# Patient Record
Sex: FEMALE | Race: BLACK OR AFRICAN AMERICAN | NOT HISPANIC OR LATINO | URBAN - METROPOLITAN AREA
[De-identification: names, ages, dates, MRNs, and addresses within clinical notes are randomized per-mention and may not be internally consistent; named-entity substitution may affect disease eponyms.]

---

## 2018-10-14 ENCOUNTER — EMERGENCY (EMERGENCY)
Facility: HOSPITAL | Age: 28
LOS: 1 days | Discharge: HOME | End: 2018-10-14
Attending: EMERGENCY MEDICINE | Admitting: EMERGENCY MEDICINE
Payer: MEDICAID

## 2018-10-14 VITALS
TEMPERATURE: 99 F | HEART RATE: 99 BPM | SYSTOLIC BLOOD PRESSURE: 121 MMHG | OXYGEN SATURATION: 100 % | RESPIRATION RATE: 16 BRPM | DIASTOLIC BLOOD PRESSURE: 71 MMHG

## 2018-10-14 VITALS
TEMPERATURE: 98 F | RESPIRATION RATE: 18 BRPM | DIASTOLIC BLOOD PRESSURE: 59 MMHG | OXYGEN SATURATION: 100 % | HEART RATE: 73 BPM | SYSTOLIC BLOOD PRESSURE: 104 MMHG

## 2018-10-14 PROCEDURE — 99283 EMERGENCY DEPT VISIT LOW MDM: CPT | Mod: 25

## 2018-10-14 PROCEDURE — 99053 MED SERV 10PM-8AM 24 HR FAC: CPT

## 2018-10-14 PROCEDURE — 12002 RPR S/N/AX/GEN/TRNK2.6-7.5CM: CPT | Mod: LT

## 2018-10-14 RX ORDER — TETANUS TOXOID, REDUCED DIPHTHERIA TOXOID AND ACELLULAR PERTUSSIS VACCINE, ADSORBED 5; 2.5; 8; 8; 2.5 [IU]/.5ML; [IU]/.5ML; UG/.5ML; UG/.5ML; UG/.5ML
0.5 SUSPENSION INTRAMUSCULAR ONCE
Qty: 0 | Refills: 0 | Status: COMPLETED | OUTPATIENT
Start: 2018-10-14 | End: 2018-10-14

## 2018-10-14 RX ADMIN — TETANUS TOXOID, REDUCED DIPHTHERIA TOXOID AND ACELLULAR PERTUSSIS VACCINE, ADSORBED 0.5 MILLILITER(S): 5; 2.5; 8; 8; 2.5 SUSPENSION INTRAMUSCULAR at 09:08

## 2018-10-14 NOTE — ED ADULT TRIAGE NOTE - CHIEF COMPLAINT QUOTE
pt states she was playing with a razor and accidently cut her wrist. wrapped in gauze by ems, no active bleeding noted. pt denies si/hi/ah/vh or etoh and drug use. nad noted

## 2018-10-14 NOTE — ED ADULT NURSE NOTE - OBJECTIVE STATEMENT
rec'd pt. a&ox3, came in c/o laceration on wrist. as per pt, "I was playing with the razor and accidentally cut myself." noted approx 1.5 inch length lac noted on left wrist, no active bleeding. also noted some superficial cuts on left f/a, and scars on left from f/a. pt. refuses to talk about the other "cuts". pt. denies any SI/HI/hallucinations. boyfriend at bedside. MD Resident Maeve made aware and will see pt. will continue to monitor

## 2018-10-14 NOTE — ED PROVIDER NOTE - CARE PLAN
Principal Discharge DX:	Laceration of forearm Principal Discharge DX:	Laceration of forearm  Secondary Diagnosis:	Depression

## 2018-10-14 NOTE — ED ADULT NURSE NOTE - HEART RATE (BEATS/MIN)
100 Pt with 4th finger injury. Oblique fracture of the 4th proximal phalange on x-ray. Hand consult, Dr Lawrence on call and will come to ED for reduction and evaluation.

## 2018-10-14 NOTE — ED ADULT NURSE REASSESSMENT NOTE - NS ED NURSE REASSESS COMMENT FT1
Pt refusing labs at this time. Having lac repaired at bedside at this time. Will continue to monitor.

## 2018-10-14 NOTE — ED PROVIDER NOTE - MEDICAL DECISION MAKING DETAILS
laceration repair laceration repair, self-inflicted wound with hx of cutting, currently denies SI/HI, offered psych eval. or referrals, pt. declines.

## 2018-10-14 NOTE — ED PROVIDER NOTE - NS ED ROS FT
Constitutional: no fever  Eyes: no conjunctivitis  Ears: no ear pain   Nose: no nasal congestion, Mouth/Throat: no throat pain, Neck: no stiffness  Cardiovascular: no chest pain  Chest: no cough  Gastrointestinal: no abdominal pain, no vomiting and diarrhea  MSK: no joint pain  : no dysuria  Skin: +laceration   Neuro: no LOC

## 2018-10-14 NOTE — ED PROVIDER NOTE - PHYSICAL EXAMINATION
gen: well appearing  Mentation: AAO x 3  psych: mood appropriate  ENT: airway patent  Eyes: conjunctivae clear bilaterally  Cardio: RRR, no m/r/g  Resp: normal BS b/l  GI: s/nt/nd   Neuro: radial median and ulnar sensory and motor function intact of left arm with intact radial pulse  Skin: 3 cm laceration over volar aspect of left forearm with no active exsanguination,   MSK: normal movement of all extremities

## 2018-10-14 NOTE — ED PROVIDER NOTE - OBJECTIVE STATEMENT
[age] yo female presenting with laceration over left forearm after she was playing with a razor.  currently states that pain is very mild, did not take anything for her symptoms, bleeding controlled with pressure.  last cut herself yesterday because of stress.  currently denies hi or si.  feels safe at home.  never seen a psychiatrist.  last tetanus unknown.

## 2018-10-14 NOTE — ED ADULT NURSE NOTE - NSIMPLEMENTINTERV_GEN_ALL_ED
Implemented All Universal Safety Interventions:  [place] to call system. Call bell, personal items and telephone within reach. Instruct patient to call for assistance. Room bathroom lighting operational. Non-slip footwear when patient is off stretcher. Physically safe environment: no spills, clutter or unnecessary equipment. Stretcher in lowest position, wheels locked, appropriate side rails in place.

## 2018-10-14 NOTE — ED PROVIDER NOTE - ATTENDING CONTRIBUTION TO CARE
Seen and examined, thin F with L volar forearm superficial straight incision with no active bleeding. Wound examined to base, no tendon involvement, full ROM and strength. Pt. with visible, multiple, healed, bilat scars to forearms btw flexor surface elbow and wrist, states has not required stitches in the past but today had inc. depth of cutting so reported to ED. Pt. endorses depression and inc. social stressors but states she has been cutting for a long period and usually cuts more when stressed. States not having thoughts of hurting self or others and has not attempted suicide or had specific plan in the past. Pt. agrees that her wound today is a result of inc. depression but prefers to FU with her PMD and does not require admission or need to see ED psychiatrist today.

## 2018-10-15 ENCOUNTER — INPATIENT (INPATIENT)
Facility: HOSPITAL | Age: 28
LOS: 9 days | Discharge: HOME | End: 2018-10-25
Attending: PSYCHIATRY & NEUROLOGY | Admitting: PSYCHIATRY & NEUROLOGY
Payer: MEDICAID

## 2018-10-15 VITALS
TEMPERATURE: 98 F | OXYGEN SATURATION: 98 % | SYSTOLIC BLOOD PRESSURE: 123 MMHG | RESPIRATION RATE: 18 BRPM | DIASTOLIC BLOOD PRESSURE: 61 MMHG | HEART RATE: 110 BPM

## 2018-10-15 DIAGNOSIS — F33.2 MAJOR DEPRESSIVE DISORDER, RECURRENT SEVERE WITHOUT PSYCHOTIC FEATURES: ICD-10-CM

## 2018-10-15 DIAGNOSIS — F41.9 ANXIETY DISORDER, UNSPECIFIED: ICD-10-CM

## 2018-10-15 DIAGNOSIS — F12.10 CANNABIS ABUSE, UNCOMPLICATED: ICD-10-CM

## 2018-10-15 LAB
ALBUMIN SERPL ELPH-MCNC: 4.5 G/DL — SIGNIFICANT CHANGE UP (ref 3.3–5)
ALP SERPL-CCNC: 37 U/L — LOW (ref 40–120)
ALT FLD-CCNC: 13 U/L — SIGNIFICANT CHANGE UP (ref 4–33)
AMPHET UR-MCNC: NEGATIVE — SIGNIFICANT CHANGE UP
APAP SERPL-MCNC: < 15 UG/ML — LOW (ref 15–25)
APPEARANCE UR: CLEAR — SIGNIFICANT CHANGE UP
AST SERPL-CCNC: 18 U/L — SIGNIFICANT CHANGE UP (ref 4–32)
BACTERIA # UR AUTO: NEGATIVE — SIGNIFICANT CHANGE UP
BARBITURATES UR SCN-MCNC: NEGATIVE — SIGNIFICANT CHANGE UP
BASOPHILS # BLD AUTO: 0.04 K/UL — SIGNIFICANT CHANGE UP (ref 0–0.2)
BASOPHILS NFR BLD AUTO: 0.5 % — SIGNIFICANT CHANGE UP (ref 0–2)
BENZODIAZ UR-MCNC: NEGATIVE — SIGNIFICANT CHANGE UP
BILIRUB SERPL-MCNC: 0.7 MG/DL — SIGNIFICANT CHANGE UP (ref 0.2–1.2)
BILIRUB UR-MCNC: NEGATIVE — SIGNIFICANT CHANGE UP
BLOOD UR QL VISUAL: NEGATIVE — SIGNIFICANT CHANGE UP
BUN SERPL-MCNC: 11 MG/DL — SIGNIFICANT CHANGE UP (ref 7–23)
CALCIUM SERPL-MCNC: 9.4 MG/DL — SIGNIFICANT CHANGE UP (ref 8.4–10.5)
CANNABINOIDS UR-MCNC: POSITIVE — SIGNIFICANT CHANGE UP
CHLORIDE SERPL-SCNC: 101 MMOL/L — SIGNIFICANT CHANGE UP (ref 98–107)
CO2 SERPL-SCNC: 23 MMOL/L — SIGNIFICANT CHANGE UP (ref 22–31)
COCAINE METAB.OTHER UR-MCNC: NEGATIVE — SIGNIFICANT CHANGE UP
COLOR SPEC: YELLOW — SIGNIFICANT CHANGE UP
CREAT SERPL-MCNC: 0.79 MG/DL — SIGNIFICANT CHANGE UP (ref 0.5–1.3)
EOSINOPHIL # BLD AUTO: 0.08 K/UL — SIGNIFICANT CHANGE UP (ref 0–0.5)
EOSINOPHIL NFR BLD AUTO: 1.1 % — SIGNIFICANT CHANGE UP (ref 0–6)
ETHANOL BLD-MCNC: < 10 MG/DL — SIGNIFICANT CHANGE UP
GLUCOSE SERPL-MCNC: 85 MG/DL — SIGNIFICANT CHANGE UP (ref 70–99)
GLUCOSE UR-MCNC: NEGATIVE — SIGNIFICANT CHANGE UP
HCG SERPL-ACNC: < 5 MIU/ML — SIGNIFICANT CHANGE UP
HCT VFR BLD CALC: 37.8 % — SIGNIFICANT CHANGE UP (ref 34.5–45)
HGB BLD-MCNC: 12.1 G/DL — SIGNIFICANT CHANGE UP (ref 11.5–15.5)
HYALINE CASTS # UR AUTO: SIGNIFICANT CHANGE UP
IMM GRANULOCYTES # BLD AUTO: 0.02 # — SIGNIFICANT CHANGE UP
IMM GRANULOCYTES NFR BLD AUTO: 0.3 % — SIGNIFICANT CHANGE UP (ref 0–1.5)
KETONES UR-MCNC: NEGATIVE — SIGNIFICANT CHANGE UP
LEUKOCYTE ESTERASE UR-ACNC: SIGNIFICANT CHANGE UP
LYMPHOCYTES # BLD AUTO: 1.92 K/UL — SIGNIFICANT CHANGE UP (ref 1–3.3)
LYMPHOCYTES # BLD AUTO: 26.1 % — SIGNIFICANT CHANGE UP (ref 13–44)
MCHC RBC-ENTMCNC: 29.8 PG — SIGNIFICANT CHANGE UP (ref 27–34)
MCHC RBC-ENTMCNC: 32 % — SIGNIFICANT CHANGE UP (ref 32–36)
MCV RBC AUTO: 93.1 FL — SIGNIFICANT CHANGE UP (ref 80–100)
METHADONE UR-MCNC: NEGATIVE — SIGNIFICANT CHANGE UP
MONOCYTES # BLD AUTO: 0.46 K/UL — SIGNIFICANT CHANGE UP (ref 0–0.9)
MONOCYTES NFR BLD AUTO: 6.3 % — SIGNIFICANT CHANGE UP (ref 2–14)
NEUTROPHILS # BLD AUTO: 4.83 K/UL — SIGNIFICANT CHANGE UP (ref 1.8–7.4)
NEUTROPHILS NFR BLD AUTO: 65.7 % — SIGNIFICANT CHANGE UP (ref 43–77)
NITRITE UR-MCNC: NEGATIVE — SIGNIFICANT CHANGE UP
NRBC # FLD: 0 — SIGNIFICANT CHANGE UP
OPIATES UR-MCNC: NEGATIVE — SIGNIFICANT CHANGE UP
OXYCODONE UR-MCNC: NEGATIVE — SIGNIFICANT CHANGE UP
PCP UR-MCNC: NEGATIVE — SIGNIFICANT CHANGE UP
PH UR: 6 — SIGNIFICANT CHANGE UP (ref 5–8)
PLATELET # BLD AUTO: 179 K/UL — SIGNIFICANT CHANGE UP (ref 150–400)
PMV BLD: 10.5 FL — SIGNIFICANT CHANGE UP (ref 7–13)
POTASSIUM SERPL-MCNC: 3.8 MMOL/L — SIGNIFICANT CHANGE UP (ref 3.5–5.3)
POTASSIUM SERPL-SCNC: 3.8 MMOL/L — SIGNIFICANT CHANGE UP (ref 3.5–5.3)
PROT SERPL-MCNC: 7.5 G/DL — SIGNIFICANT CHANGE UP (ref 6–8.3)
PROT UR-MCNC: NEGATIVE — SIGNIFICANT CHANGE UP
RBC # BLD: 4.06 M/UL — SIGNIFICANT CHANGE UP (ref 3.8–5.2)
RBC # FLD: 13.8 % — SIGNIFICANT CHANGE UP (ref 10.3–14.5)
RBC CASTS # UR COMP ASSIST: SIGNIFICANT CHANGE UP (ref 0–?)
SALICYLATES SERPL-MCNC: < 5 MG/DL — LOW (ref 15–30)
SODIUM SERPL-SCNC: 139 MMOL/L — SIGNIFICANT CHANGE UP (ref 135–145)
SP GR SPEC: 1.02 — SIGNIFICANT CHANGE UP (ref 1–1.04)
SQUAMOUS # UR AUTO: SIGNIFICANT CHANGE UP
TSH SERPL-MCNC: 1.24 UIU/ML — SIGNIFICANT CHANGE UP (ref 0.27–4.2)
UROBILINOGEN FLD QL: NORMAL — SIGNIFICANT CHANGE UP
WBC # BLD: 7.35 K/UL — SIGNIFICANT CHANGE UP (ref 3.8–10.5)
WBC # FLD AUTO: 7.35 K/UL — SIGNIFICANT CHANGE UP (ref 3.8–10.5)
WBC UR QL: SIGNIFICANT CHANGE UP (ref 0–?)

## 2018-10-15 PROCEDURE — 99285 EMERGENCY DEPT VISIT HI MDM: CPT

## 2018-10-15 NOTE — ED ADULT TRIAGE NOTE - CHIEF COMPLAINT QUOTE
p/t with hx anxiety c/o of increased anxiety, panic attacks and Si p/t crying @ present, poor historian

## 2018-10-15 NOTE — ED BEHAVIORAL HEALTH ASSESSMENT NOTE - SUICIDE RISK FACTORS
Command hallucinations to hurt self/Substance abuse/dependence/Agitation/severe anxiety/Hopelessness/Highly impulsive behavior/Perceived burden on family and others/Unable to engage in safety planning

## 2018-10-15 NOTE — ED PROVIDER NOTE - PROGRESS NOTE DETAILS
ED Attending (Dr Ling): This patient was signed out to me by the NP after their complete evaluation, at the end of their shift due to lack of disposition pending BH evaluation.  The NP performed initial exam and authored the entire note, and also completed the medical clearance.  I have personally performed a bedside re-evaluation of this patient and have followed up on outstanding issues.  I have discussed the case with the NP and I updated the note if needed. There has been no change in condition, bed is pending assignment.  Dr. Darlingfish taking over care at this point. Ivy: Medically cleared and signed out to me. Wounds sutured. HR improved. Medically cleared. Awaiting bed for [facility] admission (SI, self harm). [place]: No overnight issues. Awaiting bed for [facility] admission. Noam: pt signed out to me pending psych admission. No beds currently. Here with SA, laceration to wrist s/p repair. Medically cleared. Noam: has bed in [facility]. accepting physician Janessa Castanon.

## 2018-10-15 NOTE — ED BEHAVIORAL HEALTH ASSESSMENT NOTE - OTHER PAST PSYCHIATRIC HISTORY (INCLUDE DETAILS REGARDING ONSET, COURSE OF ILLNESS, INPATIENT/OUTPATIENT TREATMENT)
no formal prior psychiatric history (had seen a psychiatrist one time when she was 10yo for appearing withdrawn), no prior inpatient or outpatient treatment, history of cutting as a teen that recently remerged (last cut yesterday, requiring 7 stitches), no prior psychotropic medication trials

## 2018-10-15 NOTE — ED BEHAVIORAL HEALTH ASSESSMENT NOTE - SUMMARY
29yo  female, , domiciled with boyfriend, unemployed, no formal prior psychiatric history (had seen a psychiatrist one time when she was 8yo for appearing withdrawn), no prior inpatient or outpatient treatment, history of cutting as a teen that recently remerged (last cut yesterday, requiring 7 stitches), no prior psychotropic medication trials, no access to guns or weapons, no significant PMH or legal issues, substance history of cannabis (last used earlier today), self referred for ongoing suicidal ideation following a suicide attempt yesterday.     Patient presents following suicide attempt yesterday where she cut her wrist, requiring 7 stitches. Reports she was not truthful yesterday as she wanted to leave the ED and minimized her symptoms. She presents again today, reports ongoing suicidal ideation and requesting help. Denies ted and psychosis. At this time, patient meets criteria for inpatient admission as she is an imminent risk to self - patient is amenable to voluntary admission. 27yo  female, , domiciled with boyfriend, unemployed, no formal prior psychiatric history (had seen a psychiatrist one time when she was 10yo for appearing withdrawn), no prior inpatient or outpatient treatment, history of cutting as a teen that recently remerged (last cut yesterday, requiring 7 stitches), no prior psychotropic medication trials, no access to guns or weapons, no significant PMH or legal issues, substance history of cannabis (last used earlier today), self referred for ongoing suicidal ideation following a suicide attempt yesterday.     Patient presents following suicide attempt yesterday where she cut her wrist, requiring 7 stitches. Reports she was not truthful yesterday as she wanted to leave the ED and minimized her symptoms. She presents again today, reports ongoing suicidal ideation and requesting help and was fearful she would harm herself again. Denies ted and psychosis. Patient is impulsive and unpredictable with poor insight. At this time, patient meets criteria for inpatient admission as she is an imminent risk to self.

## 2018-10-15 NOTE — ED BEHAVIORAL HEALTH ASSESSMENT NOTE - HPI (INCLUDE ILLNESS QUALITY, SEVERITY, DURATION, TIMING, CONTEXT, MODIFYING FACTORS, ASSOCIATED SIGNS AND SYMPTOMS)
27yo  female, , domiciled with boyfriend, unemployed, no formal prior psychiatric history (had seen a psychiatrist one time when she was 10yo for appearing withdrawn), no prior inpatient or outpatient treatment, history of cutting as a teen that recently remerged (last cut yesterday, requiring 7 stitches), no prior psychotropic medication trials, no access to guns or weapons, no significant PMH or legal issues, substance history of cannabis (last used earlier today), self referred for ongoing suicidal ideation following a suicide attempt yesterday.     Patient unable to provide phone numbers for any collateral sources.     Patient guarded and withdrawn during interview. She reports that she presented to the ED yesterday after she cut herself during an argument with her boyfriend - refuses to disclose what argument was about. She reports minimizing what had happened and denying suicidal ideation as she wanted to leave and go home. She reports she went home, got into another argument with her boyfriend, and continued to have thoughts of self harm and came back to the ED. She reports a long history of anxiety, but is guarded as to what about. She reports life has been a "struggle" lately and she has been more depressed. She reports feeling "that this is not who I am; I'm a happy, strong person, this isn't me". She reports worsening depression, sadness, isolation, nightmares (that someone is after her) affecting her sleep, +weight loss of about 10lbs over the past 2 months, and decreased appetite. She denies suicidal ideation at this time, but feels she is unable to contract for safety and may act impulsively. Patient denies manic symptoms including elevated mood, increased irritability, mood lability, distractibility, grandiosity, pressured speech, increase in goal-directed activity, or decreased need for sleep. Patient denies any psychotic symptoms including paranoia, ideas of reference, thought insertion/broadcasting, or auditory/visual/olfactory/tactile/gustatory hallucinations. Admits to cannabis use today, denies other illicit substances or alcohol.

## 2018-10-15 NOTE — ED PROVIDER NOTE - OBJECTIVE STATEMENT
27 y/o M hx Anxiety D/O  BIB relatives w c/o worsening depression and suicidal  attempt. Admits that he she self inflicted a 3 cm laceration  to her left arm with a razor yesterday.  Admits to multiple social  stressors . Denies AH/HI/VH.  Denies falling, punching or kicking any objects. Denies pain, SOB, dizziness, fever, chills  chest/ abdominal discomfort. Denies recent use of alcohol or illicit drugs.

## 2018-10-15 NOTE — ED PROVIDER NOTE - MEDICAL DECISION MAKING DETAILS
Labs, Urine Tox/UA, EKG, HCG  Medical evaluation performed. There is no clinical evidence of intoxication or any acute medical problem requiring immediate intervention. Patient is awaiting psychiatric consultation. Final disposition will be determined by psychiatrist. 29 y/o M hx Anxiety D/O  Labs, Urine Tox/UA, EKG, HCG.  3 cm laceration to volar surface of left forearm,  repaired with 7 sutures. Area clean and dry. No evidence in infection.   Medical evaluation performed. There is no clinical evidence of intoxication or any acute medical problem requiring immediate intervention. Patient is awaiting psychiatric consultation. Final disposition will be determined by psychiatrist.

## 2018-10-15 NOTE — ED BEHAVIORAL HEALTH ASSESSMENT NOTE - RISK ASSESSMENT
high. risks include recent cutting requiring stitches, depression, no current psychiatric care, limited supports, substance abuse, unemployed, weight loss, isolation, guarded. Protective factors include no violence history,  no access to guns. patient is high risk and requires inpatient admission for safety and stabilization.

## 2018-10-15 NOTE — ED ADULT NURSE NOTE - OBJECTIVE STATEMENT
Patient received in  c/o worsening depression and SI, on presentation, bandage noted to left forearm, pt endorsed cutting self yesterday, was seen in the ED yesterday 7 stitches placed on cuts. Patient presented today BIB  for worsening SI with plan to cut self again. Patient appears very depressed and tearful, endorsing feelings or helplessness and despair. Patient is calm and cooperative. denies HI&AH. Pt endorsed MJ use, denies ETOH use. patient reassured and made comfortable, PES on side and engaging. patient encouraged to verbalize her feelings and concerns. safety and comfort measures maintaned. will continue to monitor.

## 2018-10-15 NOTE — ED BEHAVIORAL HEALTH ASSESSMENT NOTE - PSYCHIATRIC ISSUES AND PLAN (INCLUDE STANDING AND PRN MEDICATION)
will defer choice of SSRI to inpatient team as patient would like to discuss options with treatment team. ativan prns

## 2018-10-15 NOTE — ED BEHAVIORAL HEALTH ASSESSMENT NOTE - DESCRIPTION
guarded and withdrawn, at times crying during the interview  ICU Vital Signs Last 24 Hrs  T(C): 36.7 (15 Oct 2018 16:25), Max: 36.7 (15 Oct 2018 16:25)  T(F): 98 (15 Oct 2018 16:25), Max: 98 (15 Oct 2018 16:25)  HR: 110 (15 Oct 2018 16:25) (110 - 110)  BP: 123/61 (15 Oct 2018 16:25) (123/61 - 123/61)  BP(mean): --  ABP: --  ABP(mean): --  RR: 18 (15 Oct 2018 16:25) (18 - 18)  SpO2: 98% (15 Oct 2018 16:25) (98% - 98%) denied , lives with boyfriend, unemployed

## 2018-10-16 DIAGNOSIS — F33.2 MAJOR DEPRESSIVE DISORDER, RECURRENT SEVERE WITHOUT PSYCHOTIC FEATURES: ICD-10-CM

## 2018-10-16 NOTE — ED ADULT NURSE REASSESSMENT NOTE - NS ED NURSE REASSESS COMMENT FT1
pt calm & cooperative in nad denies Si/Hi/AVh presently pt aware of admission to [address].

## 2018-10-16 NOTE — ED ADULT NURSE REASSESSMENT NOTE - GENERAL PATIENT STATE
comfortable appearance/cooperative/remains awake, a&ox3, calm with sad affect. Denies intent to harm self in ed
Received pt from RN break coverage, pt sleeping, easily aroused, NAD./resting/sleeping

## 2018-10-16 NOTE — ED ADULT NURSE REASSESSMENT NOTE - STATUS
psych eval completed, pt is medically cleared awaiting bed for [facility] admission
awaiting bed, no change

## 2018-10-16 NOTE — ED BEHAVIORAL HEALTH NOTE - BEHAVIORAL HEALTH NOTE
Writer received call from [facility]. Pt assigned to [facility].

## 2018-10-16 NOTE — ED BEHAVIORAL HEALTH NOTE - BEHAVIORAL HEALTH NOTE
Patient is a 27yo  female, , domiciled with boyfriend, unemployed, no formal prior psychiatric history (had seen a psychiatrist one time when she was 8yo for appearing withdrawn), no prior inpatient or outpatient treatment, history of cutting as a teen that recently remerged (last cut yesterday, requiring 7 stitches), no prior psychotropic medication trials, no access to guns or weapons, no significant PMH or legal issues, substance history of cannabis (last used earlier today), self referred for ongoing suicidal ideation following a suicide attempt yesterday.     Met with patient this AM. She reports she came to the ER again yesterday after cutting herself and requiring stitches. She returned because she began having suicidal thoughts again and was fearful she would harm herself via cutting again. She stated she doesn't feel heard and is having continued relationship issues with her boyfriend. She reports she is unsure why she cut herself stating she wanted to hurt herself. She currently denies suicidal thoughts/plan/intent/SIB. No AH/VH.  Patient has poor insight into behaviors and is impulsive and unpredictable. Based on behavior patient continues to present an imminent risk to self requiring inpatient admission for safety and stabilization. Patient is a 29yo  female, , domiciled with boyfriend, unemployed, no formal prior psychiatric history (had seen a psychiatrist one time when she was 10yo for appearing withdrawn), no prior inpatient or outpatient treatment, history of cutting as a teen that recently remerged (last cut yesterday, requiring 7 stitches), no prior psychotropic medication trials, no access to guns or weapons, no significant PMH or legal issues, substance history of cannabis (last used earlier today), self referred for ongoing suicidal ideation following a suicide attempt yesterday.     Met with patient this AM. She reports she came to the ER again yesterday after cutting herself and requiring stitches. She returned because she began having suicidal thoughts again and was fearful she would harm herself via cutting again. She stated she doesn't feel heard and is having continued relationship issues with her boyfriend. She reports she is unsure why she cut herself stating she wanted to hurt herself. This information is not consistent with information told the MD attending stating she had originally cut herself and was trying to kill self. She currently denies suicidal thoughts/plan/intent/SIB. No AH/VH.  Patient has poor insight into behaviors and is guarded minimizing symptoms. She is impulsive and unpredictable. Based on behavior patient continues to present an imminent risk to self requiring inpatient admission for safety and stabilization.

## 2018-10-17 PROCEDURE — 99221 1ST HOSP IP/OBS SF/LOW 40: CPT

## 2018-10-18 PROCEDURE — 99231 SBSQ HOSP IP/OBS SF/LOW 25: CPT

## 2018-10-19 PROCEDURE — 99231 SBSQ HOSP IP/OBS SF/LOW 25: CPT | Mod: 25

## 2018-10-19 PROCEDURE — 90853 GROUP PSYCHOTHERAPY: CPT

## 2018-10-20 PROCEDURE — 99232 SBSQ HOSP IP/OBS MODERATE 35: CPT

## 2018-10-21 PROCEDURE — 99232 SBSQ HOSP IP/OBS MODERATE 35: CPT

## 2018-10-22 PROCEDURE — 99232 SBSQ HOSP IP/OBS MODERATE 35: CPT

## 2018-10-23 PROCEDURE — 99231 SBSQ HOSP IP/OBS SF/LOW 25: CPT

## 2018-10-24 PROCEDURE — 99231 SBSQ HOSP IP/OBS SF/LOW 25: CPT

## 2018-10-25 VITALS — TEMPERATURE: 98 F | RESPIRATION RATE: 20 BRPM

## 2018-10-25 PROCEDURE — 99239 HOSP IP/OBS DSCHRG MGMT >30: CPT

## 2020-07-08 NOTE — ED ADULT NURSE NOTE - PRIMARY CARE PROVIDER
Mailed 6/24 letter from Dr Miles to pt again at pt's request today and emailed by [employer].  
UNK

## 2021-03-31 NOTE — ED ADULT NURSE REASSESSMENT NOTE - CONDITION
unchanged Topical Retinoid counseling:  Patient advised to apply a pea-sized amount only at bedtime and wait 30 minutes after washing their face before applying.  If too drying, patient may add a non-comedogenic moisturizer. The patient verbalized understanding of the proper use and possible adverse effects of retinoids.  All of the patient's questions and concerns were addressed.

## 2024-05-01 NOTE — ED BEHAVIORAL HEALTH ASSESSMENT NOTE - NS ED BHA MED ROS SKIN
Headache, eye pain, vomiting, felt feverish.     Triage Assessment (Adult)       Row Name 05/01/24 [id number]          Triage Assessment    Airway WDL WDL        Respiratory WDL    Respiratory WDL WDL        Skin Circulation/Temperature WDL    Skin Circulation/Temperature WDL WDL        Cardiac WDL    Cardiac WDL WDL        Peripheral/Neurovascular WDL    Peripheral Neurovascular WDL WDL        Cognitive/Neuro/Behavioral WDL    Cognitive/Neuro/Behavioral WDL WDL                      Yes

## 2024-06-03 NOTE — ED ADULT NURSE NOTE - NS ED NURSE LEVEL OF CONSCIOUSNESS ORIENTATION
Health Maintenance       Depression Screening (Yearly)  Overdue since 11/23/2023    Medicare Advantage- Medicare Wellness Visit (Yearly - January to December)  Overdue since 1/1/2024    COVID-19 Vaccine (7 - 2023-24 season)  Overdue since 2/13/2024           Following review of the above:  Patient wishes to discuss with clinician: COVID-19    Note: Refer to final orders and clinician documentation.       Oriented - self; Oriented - place; Oriented - time

## 2024-09-26 NOTE — ED BEHAVIORAL HEALTH ASSESSMENT NOTE - FUND OF KNOWLEDGE
Patient Quality Outreach    Patient is due for the following:   Chlamydia Screening    Next Steps:   No follow up needed at this time.    Type of outreach:    Sent letter.    Next Steps:  Reach out within 90 days via Letter.    Max number of attempts reached: No. Will try again in 90 days if patient still on fail list.    Questions for provider review:    None           Morgan Renteria MA           Normal